# Patient Record
Sex: FEMALE | Race: WHITE | URBAN - METROPOLITAN AREA
[De-identification: names, ages, dates, MRNs, and addresses within clinical notes are randomized per-mention and may not be internally consistent; named-entity substitution may affect disease eponyms.]

---

## 2020-05-15 ENCOUNTER — IMPORTED ENCOUNTER (OUTPATIENT)
Dept: URBAN - METROPOLITAN AREA CLINIC 50 | Facility: CLINIC | Age: 75
End: 2020-05-15

## 2020-05-28 ENCOUNTER — IMPORTED ENCOUNTER (OUTPATIENT)
Dept: URBAN - METROPOLITAN AREA CLINIC 50 | Facility: CLINIC | Age: 75
End: 2020-05-28

## 2020-06-15 ENCOUNTER — IMPORTED ENCOUNTER (OUTPATIENT)
Dept: URBAN - METROPOLITAN AREA CLINIC 50 | Facility: CLINIC | Age: 75
End: 2020-06-15

## 2021-02-09 NOTE — PATIENT DISCUSSION
1 week PO: Patient is doing well post-operatively. The importance of post-op drop compliance was emphasized. Drop scheduled reviewed with patient. Patient to call if any visual changes or concerns. N/A

## 2021-02-09 NOTE — PATIENT DISCUSSION
ok to proceed with IOL due to [address] for sx made today with KMS and goal: CV -2.00.  Hx success with monoV.

## 2021-04-17 ASSESSMENT — VISUAL ACUITY
OD_CC: J2@ 16 IN
OS_CC: J2@ 18 IN
OD_SC: 20/100-
OD_OTHER: 20/20. 20/60.
OS_SC: 20/30
OD_CC: J2@ 18 IN
OD_PH: 20/25-2
OS_BAT: 20/40
OD_SC: 20/60
OS_CC: J2@ 16 IN
OS_PH: 20/20
OS_OTHER: 20/40. >20/400.
OS_SC: 20/20
OS_PH: @ 16 IN
OD_BAT: 20/20
OD_PH: @ 16 IN

## 2021-04-17 ASSESSMENT — TONOMETRY
OD_IOP_MMHG: 17
OS_IOP_MMHG: 17
OD_IOP_MMHG: 15
OS_IOP_MMHG: 15

## 2021-06-16 ENCOUNTER — PREPPED CHART (OUTPATIENT)
Dept: URBAN - METROPOLITAN AREA CLINIC 53 | Facility: CLINIC | Age: 76
End: 2021-06-16

## 2021-07-16 ENCOUNTER — ANNUAL COMPREHENSIVE EXAM (OUTPATIENT)
Dept: URBAN - METROPOLITAN AREA CLINIC 53 | Facility: CLINIC | Age: 76
End: 2021-07-16

## 2021-07-16 DIAGNOSIS — H26.491: ICD-10-CM

## 2021-07-16 DIAGNOSIS — H43.812: ICD-10-CM

## 2021-07-16 PROCEDURE — 66821 AFTER CATARACT LASER SURGERY: CPT

## 2021-07-16 PROCEDURE — 92014 COMPRE OPH EXAM EST PT 1/>: CPT

## 2021-07-16 ASSESSMENT — VISUAL ACUITY
OD_GLARE: 20/60
OS_SC: 20/25+1
OD_PH: 20/30+/-
OD_SC: 20/60
OD_GLARE: 20/30
OU_SC: J2

## 2021-07-16 ASSESSMENT — TONOMETRY
OD_IOP_MMHG: 15
OS_IOP_MMHG: 17

## 2021-08-02 ENCOUNTER — YAG CAPSULOTOMY PO (OUTPATIENT)
Dept: URBAN - METROPOLITAN AREA CLINIC 53 | Facility: CLINIC | Age: 76
End: 2021-08-02

## 2021-08-02 DIAGNOSIS — Z98.890: ICD-10-CM

## 2021-08-02 PROCEDURE — 99024 POSTOP FOLLOW-UP VISIT: CPT

## 2021-08-02 ASSESSMENT — TONOMETRY
OD_IOP_MMHG: 16
OS_IOP_MMHG: 18

## 2021-08-02 ASSESSMENT — VISUAL ACUITY
OD_SC: 20/70-1
OS_SC: 20/20-2
OD_SC: J1+

## 2022-03-30 ENCOUNTER — ESTABLISHED PATIENT (OUTPATIENT)
Dept: URBAN - METROPOLITAN AREA CLINIC 49 | Facility: CLINIC | Age: 77
End: 2022-03-30

## 2022-03-30 DIAGNOSIS — H43.812: ICD-10-CM

## 2022-03-30 PROCEDURE — 92014 COMPRE OPH EXAM EST PT 1/>: CPT

## 2022-03-30 PROCEDURE — 92134 CPTRZ OPH DX IMG PST SGM RTA: CPT

## 2022-03-30 ASSESSMENT — VISUAL ACUITY
OD_PH: 20/30
OU_SC: J1
OS_SC: 20/20-2
OD_SC: 20/70

## 2022-03-30 ASSESSMENT — TONOMETRY
OD_IOP_MMHG: 16
OS_IOP_MMHG: 16

## 2022-08-12 ENCOUNTER — COMPREHENSIVE EXAM (OUTPATIENT)
Dept: URBAN - METROPOLITAN AREA CLINIC 53 | Facility: CLINIC | Age: 77
End: 2022-08-12

## 2022-08-12 DIAGNOSIS — H43.813: ICD-10-CM

## 2022-08-12 PROCEDURE — 92014 COMPRE OPH EXAM EST PT 1/>: CPT

## 2022-08-12 ASSESSMENT — VISUAL ACUITY
OD_CC: 20/20
OU_CC: J1+@16
OS_PH: 20/20
OS_CC: 20/40

## 2022-08-12 ASSESSMENT — TONOMETRY
OS_IOP_MMHG: 16
OD_IOP_MMHG: 16

## 2024-01-05 NOTE — PATIENT DISCUSSION
The patient feels that the cataract is significantly impacting daily activities and has elected cataract surgery. The risks, benefits, and alternatives to surgery were discussed. The patient elects to proceed with surgery. 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 patient has 2 sets of 5 stairs with railings to negotiate at home.

## 2024-03-01 ENCOUNTER — COMPREHENSIVE EXAM (OUTPATIENT)
Dept: URBAN - METROPOLITAN AREA CLINIC 53 | Facility: CLINIC | Age: 79
End: 2024-03-01

## 2024-03-01 DIAGNOSIS — H52.4: ICD-10-CM

## 2024-03-01 DIAGNOSIS — H43.813: ICD-10-CM

## 2024-03-01 DIAGNOSIS — H33.301: ICD-10-CM

## 2024-03-01 PROCEDURE — 99213 OFFICE O/P EST LOW 20 MIN: CPT

## 2024-03-01 PROCEDURE — 92015 DETERMINE REFRACTIVE STATE: CPT

## 2024-03-01 ASSESSMENT — KERATOMETRY
OS_AXISANGLE2_DEGREES: 155
OD_AXISANGLE2_DEGREES: 90
OD_K1POWER_DIOPTERS: 42.25
OD_K2POWER_DIOPTERS: 42.25
OS_K1POWER_DIOPTERS: 41.00
OS_AXISANGLE_DEGREES: 065
OS_K2POWER_DIOPTERS: 41.75
OD_AXISANGLE_DEGREES: 180

## 2024-03-01 ASSESSMENT — VISUAL ACUITY
OD_CC: 20/25
OS_CC: 20/20
OU_CC: J1+

## 2024-03-01 ASSESSMENT — TONOMETRY
OS_IOP_MMHG: 15
OD_IOP_MMHG: 15

## 2024-10-29 ENCOUNTER — EMERGENCY VISIT (OUTPATIENT)
Dept: URBAN - METROPOLITAN AREA CLINIC 53 | Facility: CLINIC | Age: 79
End: 2024-10-29

## 2024-10-29 DIAGNOSIS — H35.372: ICD-10-CM

## 2024-10-29 PROCEDURE — 92134 CPTRZ OPH DX IMG PST SGM RTA: CPT

## 2024-10-29 PROCEDURE — 99214 OFFICE O/P EST MOD 30 MIN: CPT

## 2024-11-26 ENCOUNTER — DIAGNOSTICS ONLY (OUTPATIENT)
Dept: URBAN - METROPOLITAN AREA CLINIC 53 | Facility: CLINIC | Age: 79
End: 2024-11-26

## 2024-11-26 DIAGNOSIS — H53.8: ICD-10-CM

## 2024-11-26 PROCEDURE — 92083 EXTENDED VISUAL FIELD XM: CPT

## 2025-06-27 ENCOUNTER — EMERGENCY VISIT (OUTPATIENT)
Age: 80
End: 2025-06-27

## 2025-06-27 DIAGNOSIS — H00.034: ICD-10-CM

## 2025-06-27 PROCEDURE — 99213 OFFICE O/P EST LOW 20 MIN: CPT

## 2025-06-27 RX ORDER — TOBRAMYCIN / DEXAMETHASONE 3; .5 MG/ML; MG/ML: 1 SUSPENSION/ DROPS OPHTHALMIC

## 2025-06-27 RX ORDER — DOXYCYCLINE HYCLATE 100MG 100 MG/1: 1 CAPSULE ORAL TWICE A DAY

## 2025-07-02 ENCOUNTER — FOLLOW UP (OUTPATIENT)
Age: 80
End: 2025-07-02

## 2025-07-02 DIAGNOSIS — H00.034: ICD-10-CM

## 2025-07-02 PROCEDURE — 99212 OFFICE O/P EST SF 10 MIN: CPT
